# Patient Record
Sex: MALE | Race: WHITE | Employment: FULL TIME | URBAN - METROPOLITAN AREA
[De-identification: names, ages, dates, MRNs, and addresses within clinical notes are randomized per-mention and may not be internally consistent; named-entity substitution may affect disease eponyms.]

---
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
## 2023-11-29 ENCOUNTER — TELEPHONE (OUTPATIENT)
Dept: FAMILY MEDICINE CLINIC | Age: 18
End: 2023-11-29

## 2023-11-30 ENCOUNTER — TELEPHONE (OUTPATIENT)
Dept: FAMILY MEDICINE CLINIC | Age: 18
End: 2023-11-30

## 2023-12-05 ENCOUNTER — TELEPHONE (OUTPATIENT)
Dept: FAMILY MEDICINE CLINIC | Age: 18
End: 2023-12-05

## 2023-12-05 NOTE — TELEPHONE ENCOUNTER
MA reached out to patient regarding no show / missed appt. No answer, left message to call office to reschedule appointment    explained that every time he no shows he takes away from others. I even asked if I reschedule him if he would actually show he said yes he would be here.  then I explained that was going to be the last rechedule

## 2024-02-15 ENCOUNTER — APPOINTMENT (OUTPATIENT)
Dept: GENERAL RADIOLOGY | Age: 19
End: 2024-02-15
Payer: COMMERCIAL

## 2024-02-15 ENCOUNTER — HOSPITAL ENCOUNTER (OUTPATIENT)
Age: 19
Setting detail: OBSERVATION
LOS: 1 days | Discharge: HOME | End: 2024-02-16
Attending: EMERGENCY MEDICINE | Admitting: PSYCHIATRY & NEUROLOGY
Payer: COMMERCIAL

## 2024-02-15 DIAGNOSIS — S60.511A ABRASION OF RIGHT HAND, INITIAL ENCOUNTER: ICD-10-CM

## 2024-02-15 DIAGNOSIS — R45.851 VERBALIZES SUICIDAL THOUGHTS: Primary | ICD-10-CM

## 2024-02-15 PROBLEM — F32.A DEPRESSION, UNSPECIFIED: Status: ACTIVE | Noted: 2024-02-15

## 2024-02-15 LAB
ALBUMIN SERPL-MCNC: 4.8 G/DL (ref 3.4–5)
ALBUMIN/GLOB SERPL: 2.3 {RATIO} (ref 1.1–2.2)
ALP SERPL-CCNC: 72 U/L (ref 40–129)
ALT SERPL-CCNC: 26 U/L (ref 10–40)
ANION GAP SERPL CALCULATED.3IONS-SCNC: 10 MMOL/L (ref 3–16)
APAP SERPL-MCNC: <5 UG/ML (ref 10–30)
AST SERPL-CCNC: 18 U/L (ref 15–37)
BASOPHILS # BLD: 0.1 K/UL (ref 0–0.2)
BASOPHILS NFR BLD: 0.5 %
BILIRUB SERPL-MCNC: 0.9 MG/DL (ref 0–1)
BUN SERPL-MCNC: 16 MG/DL (ref 7–20)
CALCIUM SERPL-MCNC: 9.5 MG/DL (ref 8.3–10.6)
CHLORIDE SERPL-SCNC: 102 MMOL/L (ref 99–110)
CO2 SERPL-SCNC: 25 MMOL/L (ref 21–32)
CREAT SERPL-MCNC: 0.9 MG/DL (ref 0.9–1.3)
DEPRECATED RDW RBC AUTO: 13.7 % (ref 12.4–15.4)
EOSINOPHIL # BLD: 0.2 K/UL (ref 0–0.6)
EOSINOPHIL NFR BLD: 1.4 %
ETHANOLAMINE SERPL-MCNC: NORMAL MG/DL (ref 0–0.08)
FLUAV RNA RESP QL NAA+PROBE: NOT DETECTED
FLUBV RNA RESP QL NAA+PROBE: NOT DETECTED
GFR SERPLBLD CREATININE-BSD FMLA CKD-EPI: >60 ML/MIN/{1.73_M2}
GLUCOSE SERPL-MCNC: 81 MG/DL (ref 70–99)
HCT VFR BLD AUTO: 46 % (ref 40.5–52.5)
HGB BLD-MCNC: 15.6 G/DL (ref 13.5–17.5)
LYMPHOCYTES # BLD: 2.2 K/UL (ref 1–5.1)
LYMPHOCYTES NFR BLD: 16.3 %
MCH RBC QN AUTO: 29.8 PG (ref 26–34)
MCHC RBC AUTO-ENTMCNC: 34 G/DL (ref 31–36)
MCV RBC AUTO: 87.8 FL (ref 80–100)
MONOCYTES # BLD: 0.7 K/UL (ref 0–1.3)
MONOCYTES NFR BLD: 5.5 %
NEUTROPHILS # BLD: 10.2 K/UL (ref 1.7–7.7)
NEUTROPHILS NFR BLD: 76.3 %
PLATELET # BLD AUTO: 227 K/UL (ref 135–450)
PMV BLD AUTO: 8.4 FL (ref 5–10.5)
POTASSIUM SERPL-SCNC: 3.9 MMOL/L (ref 3.5–5.1)
PROT SERPL-MCNC: 6.9 G/DL (ref 6.4–8.2)
RBC # BLD AUTO: 5.24 M/UL (ref 4.2–5.9)
SALICYLATES SERPL-MCNC: <0.3 MG/DL (ref 15–30)
SARS-COV-2 RNA RESP QL NAA+PROBE: NOT DETECTED
SODIUM SERPL-SCNC: 137 MMOL/L (ref 136–145)
WBC # BLD AUTO: 13.3 K/UL (ref 4–11)

## 2024-02-15 PROCEDURE — 6360000002 HC RX W HCPCS: Performed by: NURSE PRACTITIONER

## 2024-02-15 PROCEDURE — 73130 X-RAY EXAM OF HAND: CPT

## 2024-02-15 PROCEDURE — 6370000000 HC RX 637 (ALT 250 FOR IP): Performed by: PSYCHIATRY & NEUROLOGY

## 2024-02-15 PROCEDURE — 6370000000 HC RX 637 (ALT 250 FOR IP): Performed by: REGISTERED NURSE

## 2024-02-15 PROCEDURE — 80179 DRUG ASSAY SALICYLATE: CPT

## 2024-02-15 PROCEDURE — 80053 COMPREHEN METABOLIC PANEL: CPT

## 2024-02-15 PROCEDURE — 36415 COLL VENOUS BLD VENIPUNCTURE: CPT

## 2024-02-15 PROCEDURE — 87636 SARSCOV2 & INF A&B AMP PRB: CPT

## 2024-02-15 PROCEDURE — 80143 DRUG ASSAY ACETAMINOPHEN: CPT

## 2024-02-15 PROCEDURE — G0378 HOSPITAL OBSERVATION PER HR: HCPCS

## 2024-02-15 PROCEDURE — 90715 TDAP VACCINE 7 YRS/> IM: CPT | Performed by: NURSE PRACTITIONER

## 2024-02-15 PROCEDURE — 90471 IMMUNIZATION ADMIN: CPT | Performed by: NURSE PRACTITIONER

## 2024-02-15 PROCEDURE — 85025 COMPLETE CBC W/AUTO DIFF WBC: CPT

## 2024-02-15 PROCEDURE — 99285 EMERGENCY DEPT VISIT HI MDM: CPT

## 2024-02-15 PROCEDURE — 1240000000 HC EMOTIONAL WELLNESS R&B

## 2024-02-15 PROCEDURE — 82077 ASSAY SPEC XCP UR&BREATH IA: CPT

## 2024-02-15 RX ORDER — HYDROXYZINE 50 MG/1
50 TABLET, FILM COATED ORAL 3 TIMES DAILY PRN
Status: DISCONTINUED | OUTPATIENT
Start: 2024-02-15 | End: 2024-02-16 | Stop reason: HOSPADM

## 2024-02-15 RX ORDER — TRAZODONE HYDROCHLORIDE 50 MG/1
50 TABLET ORAL NIGHTLY PRN
Status: DISCONTINUED | OUTPATIENT
Start: 2024-02-15 | End: 2024-02-16 | Stop reason: HOSPADM

## 2024-02-15 RX ORDER — POLYETHYLENE GLYCOL 3350 17 G
2 POWDER IN PACKET (EA) ORAL
Status: DISCONTINUED | OUTPATIENT
Start: 2024-02-15 | End: 2024-02-16 | Stop reason: HOSPADM

## 2024-02-15 RX ORDER — NICOTINE 21 MG/24HR
1 PATCH, TRANSDERMAL 24 HOURS TRANSDERMAL DAILY
Status: DISCONTINUED | OUTPATIENT
Start: 2024-02-15 | End: 2024-02-16 | Stop reason: HOSPADM

## 2024-02-15 RX ORDER — ACETAMINOPHEN 325 MG/1
650 TABLET ORAL EVERY 8 HOURS PRN
Status: DISCONTINUED | OUTPATIENT
Start: 2024-02-15 | End: 2024-02-16 | Stop reason: HOSPADM

## 2024-02-15 RX ORDER — LORAZEPAM 1 MG/1
1 TABLET ORAL
Status: DISCONTINUED | OUTPATIENT
Start: 2024-02-15 | End: 2024-02-16 | Stop reason: HOSPADM

## 2024-02-15 RX ORDER — IBUPROFEN 600 MG/1
600 TABLET ORAL ONCE
Status: COMPLETED | OUTPATIENT
Start: 2024-02-15 | End: 2024-02-15

## 2024-02-15 RX ADMIN — TETANUS TOXOID, REDUCED DIPHTHERIA TOXOID AND ACELLULAR PERTUSSIS VACCINE, ADSORBED 0.5 ML: 5; 2.5; 8; 8; 2.5 SUSPENSION INTRAMUSCULAR at 15:54

## 2024-02-15 RX ADMIN — IBUPROFEN 600 MG: 600 TABLET, FILM COATED ORAL at 17:58

## 2024-02-15 RX ADMIN — TRAZODONE HYDROCHLORIDE 50 MG: 50 TABLET ORAL at 19:57

## 2024-02-15 ASSESSMENT — SLEEP AND FATIGUE QUESTIONNAIRES
DO YOU USE A SLEEP AID: YES
AVERAGE NUMBER OF SLEEP HOURS: 8
DO YOU HAVE DIFFICULTY SLEEPING: YES
SLEEP PATTERN: DIFFICULTY FALLING ASLEEP

## 2024-02-15 ASSESSMENT — PATIENT HEALTH QUESTIONNAIRE - PHQ9
SUM OF ALL RESPONSES TO PHQ QUESTIONS 1-9: 2
SUM OF ALL RESPONSES TO PHQ QUESTIONS 1-9: 2
SUM OF ALL RESPONSES TO PHQ9 QUESTIONS 1 & 2: 2
2. FEELING DOWN, DEPRESSED OR HOPELESS: 1
1. LITTLE INTEREST OR PLEASURE IN DOING THINGS: 1
SUM OF ALL RESPONSES TO PHQ QUESTIONS 1-9: 2
SUM OF ALL RESPONSES TO PHQ QUESTIONS 1-9: 2

## 2024-02-15 ASSESSMENT — PAIN DESCRIPTION - DESCRIPTORS
DESCRIPTORS: ACHING
DESCRIPTORS: ACHING

## 2024-02-15 ASSESSMENT — LIFESTYLE VARIABLES
HOW MANY STANDARD DRINKS CONTAINING ALCOHOL DO YOU HAVE ON A TYPICAL DAY: PATIENT DOES NOT DRINK
HOW OFTEN DO YOU HAVE A DRINK CONTAINING ALCOHOL: NEVER

## 2024-02-15 ASSESSMENT — PAIN DESCRIPTION - ORIENTATION: ORIENTATION: RIGHT

## 2024-02-15 ASSESSMENT — PAIN - FUNCTIONAL ASSESSMENT: PAIN_FUNCTIONAL_ASSESSMENT: 0-10

## 2024-02-15 ASSESSMENT — PAIN SCALES - GENERAL
PAINLEVEL_OUTOF10: 8
PAINLEVEL_OUTOF10: 7

## 2024-02-15 ASSESSMENT — PAIN DESCRIPTION - LOCATION
LOCATION: HAND
LOCATION: HEAD

## 2024-02-15 NOTE — PLAN OF CARE
4 Eyes Skin Assessment     The patient is being assessed for  Admission    I agree that 2 RN's have performed a thorough Head to Toe Skin Assessment on the patient. ALL assessment sites listed below have been assessed.       Areas assessed for pressure by both nurses:   [x]   Head, Face, and Ears   [x]   Shoulders, Back, and Chest  [x]   Arms, Elbows, and Hands   [x]   Coccyx, Sacrum, and Ischum  [x]   Legs, Feet, and Heels    Abrasions on left forearm and abrasions on knuckles of right hand.                                  Skin Assessed Under all Medical Devices by both nurses:  N/A               All Mepilex Borders were peeled back and area peeked at by both nurses:  No:    Please list where Mepilex Borders are located:  n/a                 Does the Patient have Skin Breakdown related to pressure?  No              Donald Prevention initiated:  NA   Wound Care Orders initiated:  NA      [facility] nurse consulted for Pressure Injury (Stage 3,4, Unstageable, DTI, NWPT, Complex wounds)and New or Established Ostomies:  NA        Nurse 1 eSignature: Electronically signed by Karlene Pacheco RN on 2/15/24 at 6:44 PM EST    **SHARE this note so that the co-signing nurse is able to place an eSignature**    Nurse 2 eSignature: Electronically signed by Yeny Appiah RN on 2/15/24 at 6:47 PM EST
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
## 2024-02-15 NOTE — ED PROVIDER NOTES
[facility] ED  EMERGENCY DEPARTMENT ENCOUNTER        Pt Name: Jose Antonio Plunkett  MRN: [id number]  Birthdate 2005  Date of evaluation: 2/15/2024  Provider: DG Bowman CNP  PCP: No primary care provider on file.  Note Started: 4:24 PM EST 2/15/24      SARAHI. I have evaluated this patient.        CHIEF COMPLAINT       Chief Complaint   Patient presents with    Hand Injury     Pt punched through door. C/o R hand pain.        HISTORY OF PRESENT ILLNESS: 1 or more Elements     History From: Patient     Limitations to history : None    Social Determinants Significantly Affecting Health : None    Chief Complaint: Hand pain     Jose Antonio Plunkett is a 18 y.o. male who presents to the emergency department today with symptoms of right hand pain after he punched a wall/door.  He states that he was angry punched the wall.  Patient initially had no other acute complaints.  His mother is with him and pulled me aside in the hallway and reported that she was concerned for his psychiatric wellbeing.  Concerned that he may want to harm himself.  He has made some moments to her that he does not want to hurt himself and is also made some cuts to his left forearm.  No other acute complaints at this time.  Patient states that he is not suicidal but his mom reports that she is afraid that if he leaves emergency department that he will attempt to kill himself as he is told her he is going to harm himself.    Nursing Notes were all reviewed and agreed with or any disagreements were addressed in the HPI.    REVIEW OF SYSTEMS :      Review of Systems    Positives and Pertinent negatives as per HPI.     SURGICAL HISTORY   No past surgical history on file.    CURRENTMEDICATIONS       Previous Medications    AMPHETAMINE-DEXTROAMPHETAMINE (MYDAYIS PO)    Take 37.5 mg by mouth    FLUOXETINE (PROZAC) 20 MG TABLET    Take 1 tablet by mouth daily    HYDROXYZINE PAMOATE (VISTARIL) 50 MG CAPSULE    Take 1 capsule by mouth     LISDEXAMFETAMINE DIMESYLATE 60 MG CAPS    Take 60 mg by mouth every morning.    SERTRALINE HCL PO    Take 75 mg by mouth    TRAZODONE (DESYREL) 50 MG TABLET    Take 1 tablet by mouth       ALLERGIES     Patient has no known allergies.    FAMILYHISTORY     No family history on file.     SOCIAL HISTORY       Social History     Tobacco Use    Smoking status: Never    Smokeless tobacco: Never   Substance Use Topics    Alcohol use: Yes     Comment: social    Drug use: Yes     Types: Marijuana (Weed)     Comment: rare       SCREENINGS                         CIWA Assessment  BP: (!) 157/89  Pulse: 68             PHYSICAL EXAM  1 or more Elements     ED Triage Vitals [02/15/24 1507]   BP Temp Temp Source Pulse Respirations SpO2 Height Weight - Scale   (!) 157/89 97.1 °F (36.2 °C) Oral 68 16 98 % -- 127 kg (280 lb)       Physical Exam  Vitals and nursing note reviewed.   Constitutional:       Appearance: Normal appearance. He is not toxic-appearing or diaphoretic.   HENT:      Head: Normocephalic and atraumatic.      Nose: Nose normal.   Eyes:      General:         Right eye: No discharge.         Left eye: No discharge.   Pulmonary:      Effort: Pulmonary effort is normal. No respiratory distress.   Musculoskeletal:         General: Normal range of motion.      Cervical back: Normal range of motion and neck supple.   Skin:     General: Skin is warm and dry.   Neurological:      General: No focal deficit present.      Mental Status: He is alert and oriented to person, place, and time.   Psychiatric:         Attention and Perception: He is attentive. He does not perceive auditory or visual hallucinations.         Mood and Affect: Mood is anxious.         Speech: Speech normal.         Behavior: Behavior is not aggressive or withdrawn. Behavior is cooperative.         Thought Content: Thought content does not include homicidal or suicidal ideation.           DIAGNOSTIC RESULTS   LABS:    Labs Reviewed   CBC WITH AUTO

## 2024-02-15 NOTE — BH NOTE
[facility]  Admission Note     Admission Type:   Admission Type: Voluntary    Reason for admission:  Reason for Admission: Self injurous behavior, noncompliant with medications      Addictive Behavior:   Addictive Behavior  In the Past 3 Months, Have You Felt or Has Someone Told You That You Have a Problem With  : None    Medical Problems:   Past Medical History:   Diagnosis Date    ADHD     Anxiety     Bipolar 1 disorder (HCC)     Depression        Status EXAM:  Mental Status and Behavioral Exam  Normal: No  Level of Assistance: Independent/Self  Facial Expression: Worried  Affect: Congruent, Stable  Level of Consciousness: Alert  Frequency of Checks: 4 times per hour, close  Mood:Normal: No  Mood: Anxious  Motor Activity:Normal: Yes  Eye Contact: Good  Observed Behavior: Cooperative, Friendly  Sexual Misconduct History: Current - no  Preception: [place] to person, [place] to place, [place] to time, [place] to situation  Attention:Normal: Yes  Thought Processes: Unremarkable  Thought Content:Normal: Yes  Depression Symptoms: Sleep disturbance  Anxiety Symptoms: Generalized  Zoraida Symptoms: Poor judgment  Hallucinations: None  Delusions: No  Memory:Normal: Yes  Insight and Judgment: No  Insight and Judgment: Poor judgment, Poor insight    Tobacco Screening:  Practical Counseling, on admission, sherrell X, if applicable and completed (first 3 are required if patient doesn't refuse):            ( ) Recognizing danger situations (included triggers and roadblocks)                    ( ) Coping skills (new ways to manage stress,relaxation techniques, changing routine, distraction)                                                           ( ) Basic information about quitting (benefits of quitting, techniques in how to quit, available resources  ( ) [facility]

## 2024-02-15 NOTE — ED NOTES
Presenting Problem: Patient presents to ED voluntarily after punched his hand into a door. Pt came to the ED to get his hand checked out. Pt was placed on a SOB by ED MD. Pt had suicidal thoughts last night and some this morning. Pt reports he denies suicidal ideations, plan, and intent at this time. Pt had sent text messages to his mom stating he didn't know if he wanted to end it all or keep pushing, pt made statements he hated who he was as a person and made statements about testing his breaks and crashing into a tree or telephone pole. Pt reports he has been going through a lot of stress. Pt reports he and his son's mother split up and reports she will not let him see their son. He denies homicidal ideations. Pt denies audio/visual hallucinations.     Appearance/Hygiene:  well-appearing, hospital attire, seated in bed, good grooming, and good hygiene   Motor Behavior: WNL   Attitude: cooperative  Affect: normal affect   Speech: normal pitch and normal volume  Mood: anxious and depressed   Thought Processes: [place] and Logical  Perceptions: Absent   Thought content: Future oriented    Orientation: A&Ox4   Memory: intact  Concentration: Good    Insight/ judgement: impaired judgment and insight       Psychosocial and contextual factors: Pt reports he lives at home with his mom. He reports he works full time at [employer] in [place]. Pt reports his appetite has remained normal for him. He reports his sleep has gotten better. Pt reports he has been off his medications for about a month. Pt reports he vapes with 5 % nicotine in it. Pt reports he has his mom for support. Pt reports he smokes marijuana. 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 Pt reports he made superficial lacerations to his forearm last night. He reports used to cope with alcohol does not know how to cope.     C-SSRS flowsheet is  Complete.    Psychiatric History (including current outpatient provider

## 2024-02-15 NOTE — PLAN OF CARE
CSSR-S Assessment completed with patient who then scored low risk upon arrival to our unit.      Provider Dr. Moran was notified of no risk  core, via Telephone at [phone].      Were suicide precautions ordered: YES carried over from ED  Pt denies current SI/HI/VH/AH and agrees to communicate with staff if no longer feel safe or in control.      Provider gave verbal order to discontinue suicide precautions and constant observations.     Completed by: Karlene Pacheco RN

## 2024-02-15 NOTE — ED NOTES
Level of Care Disposition: admit     Patient was seen by ED provider and Nursing/SW staff.  The case presented to psychiatric provider on-call .  Based on the ED evaluation and information presented to the provider by this writer it is the recommendation of the on call psychiatric provider that inpatient hospitalization is the least restrictive environment for the patient at this time.  The patient will be admitted to the inpatient unit.           Insurance Pre certification Authorization: [facility] [place] medicaid           Macy Marmolejo MSW,LSW

## 2024-02-15 NOTE — PLAN OF CARE
Patient arrived to unit at 1806 via wheelchair. Accompanied by security and extra staff member.  Patient was shown assigned room and vitals obtained.  Patient is anxious and tearful, but cooperative.  Constant observation orders placed per protocol.  Sitter remains at bedside.

## 2024-02-16 ENCOUNTER — APPOINTMENT (OUTPATIENT)
Dept: CT IMAGING | Age: 19
End: 2024-02-16
Payer: COMMERCIAL

## 2024-02-16 ENCOUNTER — APPOINTMENT (OUTPATIENT)
Dept: GENERAL RADIOLOGY | Age: 19
End: 2024-02-16
Payer: COMMERCIAL

## 2024-02-16 VITALS
RESPIRATION RATE: 20 BRPM | WEIGHT: 285 LBS | DIASTOLIC BLOOD PRESSURE: 88 MMHG | SYSTOLIC BLOOD PRESSURE: 139 MMHG | HEART RATE: 82 BPM | OXYGEN SATURATION: 98 % | TEMPERATURE: 97.8 F | BODY MASS INDEX: 38.6 KG/M2 | HEIGHT: 72 IN

## 2024-02-16 PROBLEM — R93.89 ABNORMAL CT OF THE CHEST: Status: ACTIVE | Noted: 2024-02-16

## 2024-02-16 PROBLEM — J40 TRACHEOBRONCHITIS: Status: ACTIVE | Noted: 2024-02-16

## 2024-02-16 PROBLEM — F10.21 ALCOHOL USE DISORDER, MODERATE, IN EARLY REMISSION (HCC): Status: ACTIVE | Noted: 2024-02-16

## 2024-02-16 PROBLEM — Z98.890 S/P CRANIOTOMY: Status: ACTIVE | Noted: 2024-02-16

## 2024-02-16 PROBLEM — S60.511A ABRASION OF RIGHT HAND: Status: ACTIVE | Noted: 2024-02-16

## 2024-02-16 PROBLEM — R06.02 SHORTNESS OF BREATH: Status: ACTIVE | Noted: 2024-02-16

## 2024-02-16 PROBLEM — F12.20 CANNABIS USE DISORDER, MODERATE, DEPENDENCE (HCC): Status: ACTIVE | Noted: 2024-02-16

## 2024-02-16 PROBLEM — C96.6 LANGERHAN'S CELL HISTIOCYTOSIS (HCC): Status: ACTIVE | Noted: 2024-02-16

## 2024-02-16 PROBLEM — J18.9 PNEUMONIA OF LEFT UPPER LOBE DUE TO INFECTIOUS ORGANISM: Status: ACTIVE | Noted: 2024-02-16

## 2024-02-16 PROBLEM — M89.9 FRONTAL SKULL LESION: Status: ACTIVE | Noted: 2022-05-10

## 2024-02-16 PROBLEM — C96.6 LCH (LANGERHAN'S CELL HISTIOCYTOSIS) (HCC): Status: ACTIVE | Noted: 2024-02-16

## 2024-02-16 PROBLEM — R45.851 VERBALIZES SUICIDAL THOUGHTS: Status: ACTIVE | Noted: 2024-02-16

## 2024-02-16 LAB
BASOPHILS # BLD: 0 K/UL (ref 0–0.2)
BASOPHILS NFR BLD: 0.4 %
D DIMER: <0.27 UG/ML FEU (ref 0–0.6)
DEPRECATED RDW RBC AUTO: 13.8 % (ref 12.4–15.4)
EKG ATRIAL RATE: 63 BPM
EKG DIAGNOSIS: NORMAL
EKG P AXIS: 50 DEGREES
EKG P-R INTERVAL: 134 MS
EKG Q-T INTERVAL: 392 MS
EKG QRS DURATION: 110 MS
EKG QTC CALCULATION (BAZETT): 401 MS
EKG R AXIS: 57 DEGREES
EKG T AXIS: 26 DEGREES
EKG VENTRICULAR RATE: 63 BPM
EOSINOPHIL # BLD: 0.2 K/UL (ref 0–0.6)
EOSINOPHIL NFR BLD: 1.7 %
FLUAV RNA RESP QL NAA+PROBE: NOT DETECTED
FLUBV RNA RESP QL NAA+PROBE: NOT DETECTED
HCT VFR BLD AUTO: 46.8 % (ref 40.5–52.5)
HGB BLD-MCNC: 16 G/DL (ref 13.5–17.5)
LYMPHOCYTES # BLD: 1 K/UL (ref 1–5.1)
LYMPHOCYTES NFR BLD: 8.7 %
MCH RBC QN AUTO: 29.8 PG (ref 26–34)
MCHC RBC AUTO-ENTMCNC: 34.1 G/DL (ref 31–36)
MCV RBC AUTO: 87.5 FL (ref 80–100)
MONOCYTES # BLD: 0.8 K/UL (ref 0–1.3)
MONOCYTES NFR BLD: 6.6 %
NEUTROPHILS # BLD: 9.6 K/UL (ref 1.7–7.7)
NEUTROPHILS NFR BLD: 82.6 %
NT-PROBNP SERPL-MCNC: <36 PG/ML (ref 0–124)
PLATELET # BLD AUTO: 203 K/UL (ref 135–450)
PMV BLD AUTO: 8.3 FL (ref 5–10.5)
RBC # BLD AUTO: 5.35 M/UL (ref 4.2–5.9)
RSV AG NOSE QL: NEGATIVE
SARS-COV-2 RNA RESP QL NAA+PROBE: NOT DETECTED
TROPONIN, HIGH SENSITIVITY: 6 NG/L (ref 0–22)
WBC # BLD AUTO: 11.6 K/UL (ref 4–11)

## 2024-02-16 PROCEDURE — 84484 ASSAY OF TROPONIN QUANT: CPT

## 2024-02-16 PROCEDURE — 99223 1ST HOSP IP/OBS HIGH 75: CPT | Performed by: PSYCHIATRY & NEUROLOGY

## 2024-02-16 PROCEDURE — 71045 X-RAY EXAM CHEST 1 VIEW: CPT

## 2024-02-16 PROCEDURE — 36415 COLL VENOUS BLD VENIPUNCTURE: CPT

## 2024-02-16 PROCEDURE — 85025 COMPLETE CBC W/AUTO DIFF WBC: CPT

## 2024-02-16 PROCEDURE — 85379 FIBRIN DEGRADATION QUANT: CPT

## 2024-02-16 PROCEDURE — 99255 IP/OBS CONSLTJ NEW/EST HI 80: CPT | Performed by: INTERNAL MEDICINE

## 2024-02-16 PROCEDURE — 71260 CT THORAX DX C+: CPT

## 2024-02-16 PROCEDURE — 99222 1ST HOSP IP/OBS MODERATE 55: CPT | Performed by: NURSE PRACTITIONER

## 2024-02-16 PROCEDURE — 87807 RSV ASSAY W/OPTIC: CPT

## 2024-02-16 PROCEDURE — G0378 HOSPITAL OBSERVATION PER HR: HCPCS

## 2024-02-16 PROCEDURE — 93306 TTE W/DOPPLER COMPLETE: CPT

## 2024-02-16 PROCEDURE — 6370000000 HC RX 637 (ALT 250 FOR IP): Performed by: PSYCHIATRY & NEUROLOGY

## 2024-02-16 PROCEDURE — 83880 ASSAY OF NATRIURETIC PEPTIDE: CPT

## 2024-02-16 PROCEDURE — 93005 ELECTROCARDIOGRAM TRACING: CPT | Performed by: NURSE PRACTITIONER

## 2024-02-16 PROCEDURE — 87636 SARSCOV2 & INF A&B AMP PRB: CPT

## 2024-02-16 PROCEDURE — 5130000000 HC BRIDGE APPOINTMENT

## 2024-02-16 PROCEDURE — 6370000000 HC RX 637 (ALT 250 FOR IP): Performed by: NURSE PRACTITIONER

## 2024-02-16 PROCEDURE — 6360000004 HC RX CONTRAST MEDICATION: Performed by: NURSE PRACTITIONER

## 2024-02-16 PROCEDURE — 93010 ELECTROCARDIOGRAM REPORT: CPT | Performed by: INTERNAL MEDICINE

## 2024-02-16 RX ORDER — IBUPROFEN 200 MG
TABLET ORAL 2 TIMES DAILY
Status: DISCONTINUED | OUTPATIENT
Start: 2024-02-16 | End: 2024-02-16 | Stop reason: HOSPADM

## 2024-02-16 RX ORDER — TRAZODONE HYDROCHLORIDE 50 MG/1
50 TABLET ORAL NIGHTLY
Status: DISCONTINUED | OUTPATIENT
Start: 2024-02-16 | End: 2024-02-16 | Stop reason: HOSPADM

## 2024-02-16 RX ORDER — GUAIFENESIN 600 MG/1
600 TABLET, EXTENDED RELEASE ORAL 2 TIMES DAILY
Status: DISCONTINUED | OUTPATIENT
Start: 2024-02-16 | End: 2024-02-16 | Stop reason: HOSPADM

## 2024-02-16 RX ORDER — ZIPRASIDONE HYDROCHLORIDE 20 MG/1
60 CAPSULE ORAL 2 TIMES DAILY WITH MEALS
Status: DISCONTINUED | OUTPATIENT
Start: 2024-02-16 | End: 2024-02-16 | Stop reason: HOSPADM

## 2024-02-16 RX ORDER — ZIPRASIDONE HYDROCHLORIDE 60 MG/1
60 CAPSULE ORAL 2 TIMES DAILY WITH MEALS
Qty: 60 CAPSULE | Refills: 0 | Status: SHIPPED | OUTPATIENT
Start: 2024-02-16 | End: 2024-03-17

## 2024-02-16 RX ORDER — TRAZODONE HYDROCHLORIDE 50 MG/1
50 TABLET ORAL NIGHTLY
Qty: 30 TABLET | Refills: 0 | Status: SHIPPED | OUTPATIENT
Start: 2024-02-16

## 2024-02-16 RX ORDER — DOXYCYCLINE HYCLATE 100 MG
100 TABLET ORAL EVERY 12 HOURS SCHEDULED
Qty: 14 TABLET | Refills: 0 | Status: SHIPPED | OUTPATIENT
Start: 2024-02-16 | End: 2024-02-23

## 2024-02-16 RX ORDER — FLUTICASONE PROPIONATE 50 MCG
1 SPRAY, SUSPENSION (ML) NASAL DAILY PRN
Status: DISCONTINUED | OUTPATIENT
Start: 2024-02-16 | End: 2024-02-16 | Stop reason: HOSPADM

## 2024-02-16 RX ORDER — DOXYCYCLINE HYCLATE 100 MG
100 TABLET ORAL EVERY 12 HOURS SCHEDULED
Status: DISCONTINUED | OUTPATIENT
Start: 2024-02-16 | End: 2024-02-16 | Stop reason: HOSPADM

## 2024-02-16 RX ADMIN — POLYMYXIN B SULFATE, BACITRACIN ZINC, NEOMYCIN SULFATE: 5000; 3.5; 4 OINTMENT TOPICAL at 15:28

## 2024-02-16 RX ADMIN — IOPAMIDOL 75 ML: 755 INJECTION, SOLUTION INTRAVENOUS at 11:07

## 2024-02-16 RX ADMIN — GUAIFENESIN 600 MG: 600 TABLET, EXTENDED RELEASE ORAL at 13:56

## 2024-02-16 RX ADMIN — ACETAMINOPHEN 650 MG: 325 TABLET ORAL at 08:00

## 2024-02-16 ASSESSMENT — PATIENT HEALTH QUESTIONNAIRE - PHQ9
SUM OF ALL RESPONSES TO PHQ QUESTIONS 1-9: 2
1. LITTLE INTEREST OR PLEASURE IN DOING THINGS: 1
SUM OF ALL RESPONSES TO PHQ QUESTIONS 1-9: 2
2. FEELING DOWN, DEPRESSED OR HOPELESS: 1
SUM OF ALL RESPONSES TO PHQ QUESTIONS 1-9: 2
SUM OF ALL RESPONSES TO PHQ QUESTIONS 1-9: 2
SUM OF ALL RESPONSES TO PHQ9 QUESTIONS 1 & 2: 2

## 2024-02-16 ASSESSMENT — SLEEP AND FATIGUE QUESTIONNAIRES
AVERAGE NUMBER OF SLEEP HOURS: 8
DO YOU HAVE DIFFICULTY SLEEPING: YES
DO YOU USE A SLEEP AID: YES

## 2024-02-16 ASSESSMENT — PAIN DESCRIPTION - LOCATION: LOCATION: GENERALIZED

## 2024-02-16 ASSESSMENT — PAIN DESCRIPTION - DESCRIPTORS: DESCRIPTORS: ACHING

## 2024-02-16 ASSESSMENT — PAIN SCALES - GENERAL: PAINLEVEL_OUTOF10: 5

## 2024-02-16 NOTE — BH NOTE
Pt approached team station with complaints of shortness of breath and stabbing pain throughout chest. Vitals stable, no drop in O2 saturation. No increased work of breathing.   Elisha Olvera CNP made aware. New orders noted.

## 2024-02-16 NOTE — DISCHARGE INSTRUCTIONS
[facility], will need a repeat CT in 3 months to make sure infection has resolved    CT of chest showed: No evidence of pulmonary embolism.   Small focal area of tree-in-bud nodularity in the left upper lobe, consistent   with a infectious or inflammatory process.     Echo 2/16/2024   Summary    Left ventricular systolic function is normal with ejection fraction    estimated at 55%.    No regional wall motion abnormalities.    Normal left ventricular diastolic filling pressure.    Normal left ventricular size and wall thickness.    The right ventricle is normal in size and function.    No clinically significant valvular disease identified.    No pericardial effusion noted.    IVC size is normal (<2.1cm) and collapses > 50% with respiration consistent    with normal RA pressure (3mmHg).             Stop Vaping      ADVANCED DIRECTIVES    Patient does not  have a surrogate decision maker appointed   Name (if yes): n/a   Phone Number: n/a  Patient does not have a psychiatric and/ or medical advanced directive or power of .   Patient was offered psychiatric and/ or medical advanced directive or power of  information/completion but declined to complete   Why not? N/A    Discharge Planning is Complete.    Discharge Date: 2/16/2024   Reason for Hospitalization: Patient is a 18 year old male who presented to ED voluntarily after punched his hand into a door. Pt came to the ED to get his hand checked out. Pt was placed on a SOB by ED MD. Pt had suicidal thoughts last night and some this morning.   Discharge Diagnosis: Depression ,Unspecified  Discharging to: personal Residence  Discharging address: [address] [place] [address]     INSTRUCTIONS    Your physician here was Frnak Moran MD If you have any questions please call the unit at [phone] for the adult unit or [phone] for [facility].    If you experience a

## 2024-02-16 NOTE — FLOWSHEET NOTE
02/16/24 1448   C-SSRS Suicide Frequent Screening   2) Since you were last asked, have you actually had thoughts about killing yourself?  No   6) Since you were last asked, have you done anything, started to do anything, or prepared to do anything to end your life? No   Risk of Suicide No Risk

## 2024-02-16 NOTE — BH NOTE
Pt reported to writer that when he cut himself prior to admission it was not because he was anxious, it was because he did not want to go to work. Dr Moran made aware.

## 2024-02-16 NOTE — H&P
[address] [place] [address]                              HISTORY AND PHYSICAL    PATIENT NAME: MAXIMILIANO SPANGLER                 :        2005  MED REC NO:   [id number]                          ROOM:  ACCOUNT NO:   [id number]                           ADMIT DATE: 02/15/2024  PROVIDER:     Frank Moran MD    PSYCHIATRY HISTORY AND PHYSICAL    DATE OF EVALUATION:  2024    IDENTIFICATION:  This is a domiciled, never , and fully employed  18-year-old with a self-reported history of multiple psychiatric  diagnoses who self-presented to our emergency department to get his hand  checked out and was later admitted to Psychiatry for evaluation because  he had engaged in self-harm.    SOURCES OF INFORMATION:  The patient.  Focused record review.    CHIEF COMPLAINT:  \"I didn't want to go to work.  I don't want to kill  myself.\"    HISTORY OF PRESENT ILLNESS:  The patient reports he is followed on  an outpatient basis at [facility] in [place].  He is prescribed  Geodon, Zoloft, and p.r.n. trazodone but has not been adherent with  them.    Regarding stressors, he reports that he has been in a custody deleon over a son.    He started working for [employer] about 7 months ago and last week was  moved from day to night shift.  For the last week, he has worked from 11 p.m. to  7 a.m.      Yesterday, while driving to work, he felt upset.  He says he did not  want to go to work, but could not miss another day or he would be fired.  Instead, he thought of ways to get an excuse, so he made superficial  scratches to his forearm.  At some point, he punched through the door at  his home too.    He came to our emergency department to have his hand examined.  While  there, his mother told the emergency department he had engaged in  self-harm.      The patient says that he was not thinking of suicide when he  made the

## 2024-02-16 NOTE — CONSULTS
Pulmonary & Critical Care Consultation Note    Patient is being seen at the request of DG Hua CNP   for a consultation for abnormal CT    HISTORY OF PRESENT ILLNESS:   18 years old with history of Langerhans cell histocytosis admitted to psych.  Had CT chest 2/16/2024 imaging reviewed by me and showed scattered mild area of tree-in-bud nodularity left upper lobe.  Not sure what makes it better or worse.  Cough with yellow sputum production for 1 to 2 days.  + Sore throat.  Feels he has pretty infection.  Has been vaping for 4 years.  Outside his recent respiratory illness no history of lung disease.  Active able to walk as far as he wants.  No respiratory symptoms.  No inhaled bronchodilators.  No O2.  No history of asthma.    PAST MEDICAL HISTORY:  Past Medical History:   Diagnosis Date    ADHD     Anxiety     Bipolar 1 disorder (HCC)     Depression      PAST SURGICAL HISTORY:  History reviewed. No pertinent surgical history.    FAMILY HISTORY:  No lung cancer    SOCIAL HISTORY:   reports that he has never smoked. He has never used smokeless tobacco.    Scheduled Meds:   neomycin-bacitracin-polymyxin   Topical BID    doxycycline hyclate  100 mg Oral 2 times per day    guaiFENesin  600 mg Oral BID    nicotine  1 patch TransDERmal Daily     Continuous Infusions:    PRN Meds:  perflutren lipid microspheres, fluticasone, acetaminophen, hydrOXYzine HCl, traZODone, nicotine polacrilex, LORazepam    ALLERGIES:  Patient has No Known Allergies.    REVIEW OF SYSTEMS:  Constitutional: Negative for fever  HENT: Negative for sore throat  Eyes: Negative for redness   Respiratory: + Cough  Cardiovascular: Negative for chest pain  Gastrointestinal: Negative for vomiting, diarrhea   Genitourinary: Negative for hematuria   Musculoskeletal: Negative for arthralgias   Skin: Negative for rash  Neurological: Negative for syncope  Hematological: Negative for adenopathy  Psychiatric/Behavorial: Negative for anxiety    PHYSICAL  effusions.    ASSESSMENT:  Tracheobronchitis with possible pneumonia  Abnormal CT chest 2/15/2024 with MARLO tree-in-bud nodularity.  Favor infectious etiology.  Cannot exclude LCH, although radiographically less likely.  Right frontal craniectomy with removal of right frontal skull lesion and titanium cranioplasty May 2022.  Active lesional histiocytic disease best treated within LC framework.    PLAN:  Levaquin for 7 days  CT chest in 4 to 8 weeks to document resolution of abnormalities, and will obtain PFTs same time evaluate for obstructive airway disease given history of LCH.  Patient verbalized understanding the need to follow-up with testing (Office is  notified)   Vaping cessation counseling  Advised to follow-up with [facility] neurosurgery department

## 2024-02-16 NOTE — CARE COORDINATION
Clinician met with the patient to conduct the psychosocial, CSSR lifetime assessments and the OQ analyst Form. Patient was cooperative answering the questions.    Sade Gonzalez, MSW    02/16/24 9243   Psychiatric History   Psychiatric history treatment Current treatment;Psychiatric admissions  (Verbalizes suicidal thoughts, Depression, unspecified: previous admit at [facility] for S/I)   Contact information Psych. [facility] treated for He reports he has been diagnosed with bipolar, depression, anxiety, ODD, and ADHD.   Are there any medication issues? Yes  (Pt reports he has been off his medications for about a month.)   Recent Psychological Experiences Other(comment)  (Patient is a 18 year old male who presented to ED voluntarily after punched his hand into a door. Pt came to the ED to get his hand checked out. Pt was placed on a SOB by ED MD. Pt had suicidal thoughts last night and some this morning.)   Support System   Support system Adequate   Types of Support System Mother  (Pt reports he has his mom for support.)   Problems in support system None   Current Living Situation   Home Living Adequate   Living information Lives with others  (Pt reports he lives at home with his mom.)   Problems with living situation  Yes  (\"Emani she states she will not allow the patient to come back to her home until he is on medications because of his violent behavior.\")   Lack of basic needs No   SSDI/SSI none   Other government assistance none   Problems with environment none   Current abuse issues no   Supervised setting None   Relationship problems Yes   Relationship problems due to  Other (Comment)  (Pt reports he and his son's mother split up and reports she will not let him see their son.)   Medical and Self-Care Issues   Relevant medical problems Verbalizes suicidal thoughts, Depression, unspecified: Hx. He reports he has been diagnosed with bipolar, depression, anxiety, ODD, and ADHD.   Relevant self-care  issues none   Barriers to treatment No   Family Constellation   Spouse/partner-name/age Pt reports he and his son's mother split up and reports she will not let him see their son.   Children-names/ages one son   Parents Cong Plunkett  [phone]  &  SHERWIN KAN  [phone]   Siblings 5 sisters and 1 brother   Support services   ([facility] in [place])   Childhood   Raised by Biological mother;Biological father   Biological mother SHERWIN KAN  [phone]   Biological father Cong Plunkett  [phone]   History of abuse No   Legal History   Legal history No   Juvenile legal history No   Abuse Assessment   Physical Abuse Denies   Verbal Abuse Denies   Emotional abuse Denies   Financial Abuse Denies   Sexual abuse Denies   Possible abuse reported to None needed   Substance Use   Use of substances  Yes  (Pt reports he smokes marijuana. 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

## 2024-02-16 NOTE — BH NOTE
Discussed patient presentation with Elisha Olvera CNP, new orders noted for Covid/flu/RSV, chest xray and CBC.

## 2024-02-16 NOTE — BH NOTE
Writer spoke with patient's mother, Emani, for collateral information.   Emani she states she will not allow the patient to come back to her home until he is on medications because of his violent behavior. She also believes he is at high risk for harming himself if he is discharged. Emani states she believes the patient is saying what he thinks we want to hear so that he can convince us he is safe to be discharged.

## 2024-02-16 NOTE — FLOWSHEET NOTE
02/16/24 1130   Vital Signs   Temp 97.8 °F (36.6 °C)   Temp Source Oral   Pulse 82   Heart Rate Source Monitor   Respirations 20   Orthostatic B/P and Pulse? Yes   Blood Pressure Lying 147/85   Pulse Lying 82 PER MINUTE   Blood Pressure Sitting 154/97   Pulse Sitting 75 PER MINUTE   Blood Pressure Standing 126/80   Pulse Standing 81 PER MINUTE     Reported to Elisha Olvera CNP

## 2024-02-16 NOTE — BH NOTE
Pt returned from CT. Pt c/o his face and feet being numb. No other neurological changes noted. VSS. No SOB. Elisha Olvera CNP present on unit and aware.

## 2024-02-16 NOTE — TRANSITION OF CARE
Medications  (Data obtained from the EMR)    Estimated Body Mass Index  Body mass index is 38.65 kg/m².      Vital Signs/Blood Pressure  /88   Pulse 82   Temp 97.8 °F (36.6 °C) (Oral)   Resp 20   Ht 1.829 m (6')   Wt 129.3 kg (285 lb)   SpO2 98%   BMI 38.65 kg/m²      Fasting Blood Glucose or Hemoglobin A1c  No results found for: \"GLU\", \"GLUCPOC\"    No results found for: \"LABA1C\", \"[id number]\"    Discharge Diagnosis: Depression, Unspecified    Discharge Plan/Destination: Personal Residence [address] [place] [id number]     Discharge Medication List and Instructions:      Medication List        STOP taking these medications      FLUoxetine 20 MG tablet  Commonly known as: PROZAC     hydrOXYzine pamoate 50 MG capsule  Commonly known as: VISTARIL     Lisdexamfetamine Dimesylate 60 MG Caps     MYDAYIS PO            ASK your doctor about these medications      sertraline 50 MG tablet  Commonly known as: ZOLOFT  Ask about: Which instructions should I use?     traZODone 50 MG tablet  Commonly known as: DESYREL              Unresulted Labs (24h ago, onward)       Start     Ordered    02/15/24 1615  Drug screen multi urine  ONE TIME,   S         02/15/24 1613                    To obtain results of studies pending at discharge, please contact [facility] [phone]    Follow-up Information    None          Advanced Directive:   Does the patient have an appointed surrogate decision maker? No  Does the patient have a Medical Advance Directive? No  Does the patient have a Psychiatric Advance Directive? No  If the patient does not have a surrogate or Medical Advance Directive AND Psychiatric Advance Directive, the patient was offered information on these advance directives Other n/a    Patient Instructions: Please continue all medications until otherwise directed by physician.  Frank Moran MD     Tobacco Cessation Discharge Plan:   Is the patient a tobacco user  and needs  referral for tobacco cessation? No  Patient referred to the following for tobacco cessation with an appointment? No  Patient was offered medication to assist with tobacco cessation at discharge? No    Alcohol/Substance Abuse Discharge Plan:   Does the patient have a history of substance/alcohol abuse and requires a referral for treatment? No  Patient referred to the following for substance/alcohol abuse treatment with an appointment? No  Patient was offered medication to assist with alcohol cessation at discharge? No      Patient discharged to: Home; Other n/a      Discharge Completed By: DONOVAN Chen, OSMAR

## 2024-02-16 NOTE — PLAN OF CARE
Echo normal, placed results on discharge paperwork.  [facility].  Continue Doxycyline.       DG Hua - CNP

## 2024-02-16 NOTE — BH NOTE
Bridge Appointment completed: Reviewed Discharge Instructions with patient.    Patient verbalizes understanding and agreement with the discharge plan using the teachback method.     Referral for Outpatient Tobacco Cessation Counseling, upon discharge (sherrell X if applicable and completed):    ( )  Hospital staff assisted patient to call Quit Line or faxed referral                                   during hospitalization                  ( )  Recognizing danger situations (included triggers and roadblocks), if not completed on admission                    ( )  Coping skills (new ways to manage stress, exercise, relaxation techniques, changing routine, distraction), if not completed on admission                                                           ( )  Basic information about quitting (benefits of quitting, techniques in how to quit, available resources, if not completed on admission  ( ) [facility]   ( X) Patient refused referral  (X ) Patient refused counseling  ( X) Patient refused smoking cessation medication upon discharge    Vaccinations (sherrell X if applicable and completed):  ( ) Patient states already received influenza vaccine elsewhere  ( ) Patient received influenza vaccine during this hospitalization  ( ) Patient refused influenza vaccine at this time  ( X) Not offered

## 2024-02-16 NOTE — PLAN OF CARE
Pt has been in his room in bed this evening. Pt has been cooperative, calm, and friendly. Pt states he is tired and just wants to get a good night's sleep and see what the day brings tomorrow. Pt denies all SI/HI/AVH.    PRN trazodone given at 1957 with effectiveness.     Home meds need reviewed- Miladis Brenneria [phone].              Problem: Anxiety  Goal: Will report anxiety at manageable levels  Outcome: Progressing     Problem: Depression/Self Harm  Goal: Effect of psychiatric condition will be minimized and patient will be protected from self harm  Outcome: Progressing     Problem: Self Harm/Suicidality  Goal: Will have no self-injury during hospital stay  Outcome: Progressing     Problem: Pain  Goal: Verbalizes/displays adequate comfort level or baseline comfort level  Outcome: Progressing

## 2024-02-16 NOTE — BH NOTE
[facility]  Discharge Note    Pt discharged with followings belongings:   Dental Appliances: None  Vision - Corrective Lenses: None  Hearing Aid: None  Jewelry: None  Body Piercings Removed: N/A  Clothing: Pants, Socks, Footwear, Shirt, Jacket/Coat  Other Valuables: Wallet, Keys, Other (Comment) (vape)   Valuables sent home with patient.. Patient educated on aftercare instructions: YES  .Patient verbalize understanding of AVS:  YES.    Status EXAM upon discharge:  Mental Status and Behavioral Exam  Normal: No  Level of Assistance: Independent/Self  Facial Expression: Brightened  Affect: Congruent  Level of Consciousness: Alert  Frequency of Checks: 4 times per hour, close  Mood:Normal: Yes  Mood: Anxious  Motor Activity:Normal: Yes  Eye Contact: Good  Observed Behavior: Cooperative  Sexual Misconduct History: Current - no  Preception: [place] to person, [place] to time, [place] to place, [place] to situation  Attention:Normal: Yes  Thought Processes: Unremarkable  Thought Content:Normal: Yes  Depression Symptoms: No problems reported or observed.  Anxiety Symptoms: No problems reported or observed.  Zoraida Symptoms: No problems reported or observed.  Hallucinations: None  Delusions: No  Memory:Normal: Yes  Insight and Judgment: No  Insight and Judgment: Poor judgment, Poor insight    Tobacco Screening:  Practical Counseling, on admission, sherrell X, if applicable and completed (first 3 are required if patient doesn't refuse)refused:            ( ) Recognizing danger situations (included triggers and roadblocks)                    ( ) Coping skills (new ways to manage stress,relaxation techniques, changing routine, distraction)                                                           ( ) Basic information about quitting (benefits of quitting, techniques in how to quit, available resources  ( ) [facility]                                                                                                                    ( x) Patient refused counseling  ( x) Patient refused referral  ( x) Patient refused prescription upon discharge  ( ) Patient has not smoked in the last 30 days    Metabolic Screening:    No results found for: \"LABA1C\"    No results found for: \"CHOL\"  No results found for: \"TRIG\"  No results found for: \"HDL\"  No components found for: \"LDLCAL\"  No components found for: \"LABVLDL\"    Sade Colunga RN

## 2024-02-16 NOTE — H&P
Hospital Medicine History & Physical      PCP: No primary care provider on file.    Date of Admission: 2/15/2024    Date of Service: Pt seen/examined on 02/16/24      Chief Complaint:    Chief Complaint   Patient presents with    Hand Injury     Pt punched through door. C/o R hand pain.          History Of Present Illness:      The patient is a 18 y.o. male with PMH of ADHD, Anxiety, bipolar, depression, Langerhan's cell histiocytosis, s/p craniotomy who [place] [facility] [place].  Patient was seen and evaluated in the ED by the ED medical provider, patient was medically cleared for admission to [facility]  This note serves as an admission medical H&P.    Tobacco use: vapes  ETOH use: denies  Illicit drug use: denies    Patient is very anxious, complaints of chest pain, shortness of breath, cough with productive yellow sputum, congestion that hs been ongoing for a couple days.  He stated his family has bronchitis at home.     Past Medical History:        Diagnosis Date    ADHD     Anxiety     Bipolar 1 disorder (HCC)     Depression        Past Surgical History:    History reviewed. No pertinent surgical history.    Medications Prior to Admission:    Prior to Admission medications    Medication Sig Start Date End Date Taking? Authorizing Provider   FLUoxetine (PROZAC) 20 MG tablet Take 1 tablet by mouth daily    Paola Rosraio MD   hydrOXYzine pamoate (VISTARIL) 50 MG capsule Take 1 capsule by mouth 11/24/23   Paola Rosario MD   traZODone (DESYREL) 50 MG tablet Take 1 tablet by mouth 11/24/23   Paola Rosario MD   Lisdexamfetamine Dimesylate 60 MG CAPS Take 60 mg by mouth every morning.  Patient not taking: Reported on 2/15/2024    Paola Rosario MD   SERTRALINE HCL PO Take 75 mg by mouth  Patient not taking: Reported on 2/15/2024    Paola Rosario MD   Amphetamine-Dextroamphetamine (MYDAYIS PO) Take 37.5 mg by mouth  Patient not taking: Reported on 2/15/2024    Abraham

## 2024-02-16 NOTE — PLAN OF CARE
Problem: Anxiety  Goal: Will report anxiety at manageable levels  Description: INTERVENTIONS:  1. Administer medication as ordered  2. Teach and rehearse alternative coping skills  3. Provide emotional support with 1:1 interaction with staff  Outcome: Progressing     Problem: Depression/Self Harm  Goal: Effect of psychiatric condition will be minimized and patient will be protected from self harm  Description: INTERVENTIONS:  1. Assess impact of patient's symptoms on level of functioning, self care needs and offer support as indicated  2. Assess patient/family knowledge of depression, impact on illness and need for teaching  3. Provide emotional support, presence and reassurance  4. Assess for possible suicidal thoughts or ideation. If patient expresses suicidal thoughts or statements do not leave alone, initiate Suicide Precautions, move to a room close to the nursing station and obtain sitter  5. Initiate consults as appropriate with Mental Health Professional, Spiritual Care, Psychosocial CNS, and consider a recommendation to the LIP for a Psychiatric Consultation  Outcome: Progressing     Problem: Self Harm/Suicidality  Goal: Will have no self-injury during hospital stay  Description: INTERVENTIONS:  1.  Ensure constant observer at bedside with Q15M safety checks  2.  Maintain a safe environment  3.  Secure patient belongings  4.  Ensure family/visitors adhere to safety recommendations  5.  Ensure safety tray has been added to patient's diet order  6.  Every shift and PRN: Re-assess suicidal risk via Frequent Screener    Outcome: Progressing     Problem: Pain  Goal: Verbalizes/displays adequate comfort level or baseline comfort level  Outcome: Progressing

## 2024-02-16 NOTE — BH NOTE
Verified medications with Miladis in [place].   In December, patient picked up:  Geodon 60 mg BID  Trazodone 50mg QHS    Zoloft 50 mg was also called in in January but was never picked up.

## 2024-02-16 NOTE — BH NOTE
CSSR-S Assessment completed with patient who then scored MODERATE RISK.     Provider Dr Moran was notified of MODERATE RISK score, via Telephone at 1841.      Were suicide precautions ordered: NO    If not ordered, justification as follows: Pt denies current SI, agrees to come to staff if thoughts of self harm occur.    Completed by: Yeny ORTIZ

## 2024-02-16 NOTE — BH NOTE
Pt with complaints of productive cough with yellow mucus. Pt reports body aches and chills, states he feels like he has a fever but patient is currently afebrile. Pt is noted with rhonchi in left lung. Pt is ill-appearing.

## 2024-02-19 ENCOUNTER — TELEPHONE (OUTPATIENT)
Dept: PULMONOLOGY | Age: 19
End: 2024-02-19

## 2024-02-19 NOTE — TELEPHONE ENCOUNTER
Inpatient Notes  Received: 3 days ago  Haja Soto MD Jones, Amanda N, MA  CT chest and PFTs in 8 to 12 weeks with follow-up

## 2024-03-25 ENCOUNTER — HOSPITAL ENCOUNTER (EMERGENCY)
Age: 19
Discharge: HOME | End: 2024-03-25

## 2024-04-11 ENCOUNTER — HOSPITAL ENCOUNTER (EMERGENCY)
Age: 19
Discharge: HOME | End: 2024-04-11
Attending: EMERGENCY MEDICINE
Payer: COMMERCIAL

## 2024-04-11 VITALS
TEMPERATURE: 98.4 F | RESPIRATION RATE: 16 BRPM | SYSTOLIC BLOOD PRESSURE: 137 MMHG | DIASTOLIC BLOOD PRESSURE: 81 MMHG | OXYGEN SATURATION: 99 % | HEART RATE: 71 BPM

## 2024-04-11 DIAGNOSIS — G44.209 TENSION-TYPE HEADACHE, NOT INTRACTABLE, UNSPECIFIED CHRONICITY PATTERN: Primary | ICD-10-CM

## 2024-04-11 PROCEDURE — 99283 EMERGENCY DEPT VISIT LOW MDM: CPT

## 2024-04-11 PROCEDURE — 6360000002 HC RX W HCPCS: Performed by: EMERGENCY MEDICINE

## 2024-04-11 PROCEDURE — 6370000000 HC RX 637 (ALT 250 FOR IP): Performed by: EMERGENCY MEDICINE

## 2024-04-11 RX ORDER — DIPHENHYDRAMINE HCL 25 MG
25 TABLET ORAL ONCE
Status: COMPLETED | OUTPATIENT
Start: 2024-04-11 | End: 2024-04-11

## 2024-04-11 RX ORDER — ACETAMINOPHEN 500 MG
1000 TABLET ORAL ONCE
Status: COMPLETED | OUTPATIENT
Start: 2024-04-11 | End: 2024-04-11

## 2024-04-11 RX ORDER — DEXAMETHASONE 4 MG/1
8 TABLET ORAL ONCE
Status: COMPLETED | OUTPATIENT
Start: 2024-04-11 | End: 2024-04-11

## 2024-04-11 RX ORDER — PROCHLORPERAZINE MALEATE 10 MG
10 TABLET ORAL ONCE
Status: COMPLETED | OUTPATIENT
Start: 2024-04-11 | End: 2024-04-11

## 2024-04-11 RX ADMIN — ACETAMINOPHEN 1000 MG: 500 TABLET ORAL at 18:05

## 2024-04-11 RX ADMIN — PROCHLORPERAZINE MALEATE 10 MG: 10 TABLET ORAL at 18:05

## 2024-04-11 RX ADMIN — DIPHENHYDRAMINE HCL 25 MG: 25 TABLET ORAL at 18:05

## 2024-04-11 RX ADMIN — DEXAMETHASONE 8 MG: 4 TABLET ORAL at 18:05

## 2024-04-11 ASSESSMENT — PAIN - FUNCTIONAL ASSESSMENT: PAIN_FUNCTIONAL_ASSESSMENT: 0-10

## 2024-04-11 ASSESSMENT — PAIN SCALES - GENERAL: PAINLEVEL_OUTOF10: 6
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
## 2024-04-12 ASSESSMENT — ENCOUNTER SYMPTOMS
VOMITING: 0
EYE REDNESS: 0
COUGH: 0
DIARRHEA: 0
BACK PAIN: 0
RHINORRHEA: 0
CONSTIPATION: 0
SHORTNESS OF BREATH: 0
NAUSEA: 0
ABDOMINAL PAIN: 0
EYE PAIN: 0

## 2024-05-16 ENCOUNTER — TELEPHONE (OUTPATIENT)
Dept: PULMONOLOGY | Age: 19
End: 2024-05-16

## 2024-05-16 NOTE — TELEPHONE ENCOUNTER
Patient did not show for follow up ct pft  appointment  with Dr Soto on 5/16/24    Same Day Cancellation: No    Patient rescheduled:  No    New appointment:     Patient was also no show on: N/A     LOV N/A was hosp f/u

## 2024-05-16 NOTE — TELEPHONE ENCOUNTER
Pt has also not completed CT/PFT.  Called pt [phone], L/M asking pt to return call to r/s appts.

## 2024-05-20 NOTE — TELEPHONE ENCOUNTER
Left message for Pt to return my phone call to the office on preferred number to r/s appointments.

## 2024-08-28 ENCOUNTER — HOSPITAL ENCOUNTER (EMERGENCY)
Age: 19
Discharge: HOME | End: 2024-08-28
Attending: EMERGENCY MEDICINE
Payer: COMMERCIAL

## 2024-08-28 ENCOUNTER — APPOINTMENT (OUTPATIENT)
Dept: CT IMAGING | Age: 19
End: 2024-08-28
Payer: COMMERCIAL

## 2024-08-28 VITALS
DIASTOLIC BLOOD PRESSURE: 79 MMHG | RESPIRATION RATE: 16 BRPM | HEIGHT: 72 IN | OXYGEN SATURATION: 100 % | BODY MASS INDEX: 38.51 KG/M2 | TEMPERATURE: 98.1 F | SYSTOLIC BLOOD PRESSURE: 128 MMHG | WEIGHT: 284.3 LBS | HEART RATE: 64 BPM

## 2024-08-28 DIAGNOSIS — S09.90XA INJURY OF HEAD, INITIAL ENCOUNTER: Primary | ICD-10-CM

## 2024-08-28 PROCEDURE — 6370000000 HC RX 637 (ALT 250 FOR IP): Performed by: EMERGENCY MEDICINE

## 2024-08-28 PROCEDURE — 70450 CT HEAD/BRAIN W/O DYE: CPT

## 2024-08-28 PROCEDURE — 72125 CT NECK SPINE W/O DYE: CPT

## 2024-08-28 PROCEDURE — 99284 EMERGENCY DEPT VISIT MOD MDM: CPT

## 2024-08-28 RX ORDER — DIPHENHYDRAMINE HCL 25 MG
25 TABLET ORAL ONCE
Status: COMPLETED | OUTPATIENT
Start: 2024-08-28 | End: 2024-08-28

## 2024-08-28 RX ORDER — METOCLOPRAMIDE 10 MG/1
10 TABLET ORAL ONCE
Status: COMPLETED | OUTPATIENT
Start: 2024-08-28 | End: 2024-08-28

## 2024-08-28 RX ORDER — ACETAMINOPHEN 325 MG/1
650 TABLET ORAL ONCE
Status: COMPLETED | OUTPATIENT
Start: 2024-08-28 | End: 2024-08-28

## 2024-08-28 RX ORDER — ACETAMINOPHEN 500 MG
500 TABLET ORAL 4 TIMES DAILY PRN
Qty: 120 TABLET | Refills: 0 | Status: SHIPPED | OUTPATIENT
Start: 2024-08-28

## 2024-08-28 RX ORDER — ONDANSETRON 4 MG/1
4 TABLET, FILM COATED ORAL EVERY 8 HOURS PRN
Qty: 20 TABLET | Refills: 0 | Status: SHIPPED | OUTPATIENT
Start: 2024-08-28

## 2024-08-28 RX ADMIN — ACETAMINOPHEN 650 MG: 325 TABLET ORAL at 20:50

## 2024-08-28 RX ADMIN — METOCLOPRAMIDE 10 MG: 10 TABLET ORAL at 20:50

## 2024-08-28 RX ADMIN — DIPHENHYDRAMINE HCL 25 MG: 25 TABLET ORAL at 20:50

## 2024-08-28 ASSESSMENT — PAIN SCALES - GENERAL
PAINLEVEL_OUTOF10: 0
PAINLEVEL_OUTOF10: 6

## 2024-08-28 ASSESSMENT — PAIN DESCRIPTION - FREQUENCY: FREQUENCY: CONTINUOUS

## 2024-08-28 ASSESSMENT — PAIN DESCRIPTION - LOCATION: LOCATION: HEAD

## 2024-08-28 ASSESSMENT — PAIN DESCRIPTION - PAIN TYPE: TYPE: ACUTE PAIN

## 2024-08-28 ASSESSMENT — PAIN DESCRIPTION - DESCRIPTORS: DESCRIPTORS: POUNDING;THROBBING

## 2024-08-28 ASSESSMENT — PAIN - FUNCTIONAL ASSESSMENT: PAIN_FUNCTIONAL_ASSESSMENT: 0-10
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
## 2024-10-20 ENCOUNTER — HOSPITAL ENCOUNTER (EMERGENCY)
Age: 19
Discharge: HOME | End: 2024-10-20
Attending: STUDENT IN AN ORGANIZED HEALTH CARE EDUCATION/TRAINING PROGRAM
Payer: COMMERCIAL

## 2024-10-20 VITALS
OXYGEN SATURATION: 99 % | WEIGHT: 283.6 LBS | HEART RATE: 97 BPM | DIASTOLIC BLOOD PRESSURE: 77 MMHG | HEIGHT: 72 IN | RESPIRATION RATE: 16 BRPM | SYSTOLIC BLOOD PRESSURE: 125 MMHG | BODY MASS INDEX: 38.41 KG/M2 | TEMPERATURE: 98.9 F

## 2024-10-20 DIAGNOSIS — B34.9 VIRAL ILLNESS: Primary | ICD-10-CM

## 2024-10-20 DIAGNOSIS — R11.2 NAUSEA AND VOMITING, UNSPECIFIED VOMITING TYPE: ICD-10-CM

## 2024-10-20 LAB
FLUAV RNA UPPER RESP QL NAA+PROBE: NEGATIVE
FLUBV AG NPH QL: NEGATIVE
SARS-COV-2 RDRP RESP QL NAA+PROBE: NOT DETECTED

## 2024-10-20 PROCEDURE — 87635 SARS-COV-2 COVID-19 AMP PRB: CPT

## 2024-10-20 PROCEDURE — 99283 EMERGENCY DEPT VISIT LOW MDM: CPT

## 2024-10-20 PROCEDURE — 87804 INFLUENZA ASSAY W/OPTIC: CPT

## 2024-10-20 RX ORDER — ONDANSETRON 4 MG/1
4 TABLET, FILM COATED ORAL EVERY 8 HOURS PRN
Qty: 20 TABLET | Refills: 0 | Status: SHIPPED | OUTPATIENT
Start: 2024-10-20

## 2024-10-20 ASSESSMENT — PAIN DESCRIPTION - LOCATION
LOCATION: GENERALIZED
LOCATION: GENERALIZED

## 2024-10-20 ASSESSMENT — PAIN DESCRIPTION - FREQUENCY
FREQUENCY: CONTINUOUS
FREQUENCY: CONTINUOUS

## 2024-10-20 ASSESSMENT — PAIN DESCRIPTION - DESCRIPTORS
DESCRIPTORS: ACHING
DESCRIPTORS: ACHING

## 2024-10-20 ASSESSMENT — PAIN DESCRIPTION - ONSET
ONSET: ON-GOING
ONSET: ON-GOING

## 2024-10-20 ASSESSMENT — PAIN DESCRIPTION - PAIN TYPE
TYPE: ACUTE PAIN
TYPE: ACUTE PAIN

## 2024-10-20 ASSESSMENT — PAIN - FUNCTIONAL ASSESSMENT
PAIN_FUNCTIONAL_ASSESSMENT: ACTIVITIES ARE NOT PREVENTED
PAIN_FUNCTIONAL_ASSESSMENT: ACTIVITIES ARE NOT PREVENTED
PAIN_FUNCTIONAL_ASSESSMENT: 0-10
PAIN_FUNCTIONAL_ASSESSMENT: 0-10

## 2024-10-20 ASSESSMENT — PAIN SCALES - GENERAL
PAINLEVEL_OUTOF10: 6
PAINLEVEL_OUTOF10: 5

## 2024-10-20 NOTE — ED PROVIDER NOTES
SpO2: 99%   Weight: 128.6 kg (283 lb 9.6 oz)   Height: 1.829 m (6')       The patient is a 19 y.o. male who presented with a chief complaint of flulike symptoms as above. The differential diagnosis associated with this patient's presentation includes but is not limited to viral URI, viral illness, gastroenteritis, considered infectious diarrhea however symptoms are more consistent with a viral etiology. Our workup consisted of ordering/reviewing COVID and rapid influenza swabs that are negative.  Patient had reassuring vitals and exam with clear lung sounds and no exam findings or symptoms concerning for bacterial infection and his reported symptoms are consistent with a viral illness/syndrome.  I did discuss with the patient return precautions that included sudden worsening of symptoms, shortness of breath, worsening cough and other findings concerning for superimposed bacterial infection and as well as hematochezia or melena or other symptoms he finds concerning for emergent illness or injury.  Patient stated agreement with this plan and voiced understanding return precautions and need for outpatient follow-up.  Patient was discharged in stable condition with a stable gait, regular and even respirations in no acute distress.     Medications provided in the ED and prescribed at discharge are listed below.            Diagnostic tests considered but not performed: Considered chest x-ray to evaluate for superimposed pneumonia however patient is not reporting cough, chest pain, shortness of breath or other symptoms that would be concerning for this diagnosis.    External records reviewed:      Diagnostics interpreted by me:      Discussions with other clinicians:      Chronic conditions impacting care:      Social determinants of health affecting care:            ED Medications managed:  Medications - No data to display    PROCEDURES:  Unless otherwise noted below, none.     Procedures    FINAL IMPRESSION      1.